# Patient Record
Sex: MALE | Race: OTHER | Employment: FULL TIME | URBAN - METROPOLITAN AREA
[De-identification: names, ages, dates, MRNs, and addresses within clinical notes are randomized per-mention and may not be internally consistent; named-entity substitution may affect disease eponyms.]

---

## 2019-04-10 ENCOUNTER — ORDER TRANSCRIPTION (OUTPATIENT)
Dept: SLEEP CENTER | Age: 49
End: 2019-04-10

## 2019-04-10 DIAGNOSIS — G47.33 OSA (OBSTRUCTIVE SLEEP APNEA): Primary | ICD-10-CM

## 2019-04-11 ENCOUNTER — OFFICE VISIT (OUTPATIENT)
Dept: SLEEP CENTER | Age: 49
End: 2019-04-11
Attending: GENERAL PRACTICE
Payer: COMMERCIAL

## 2019-04-11 DIAGNOSIS — G47.33 OSA (OBSTRUCTIVE SLEEP APNEA): Primary | ICD-10-CM

## 2019-04-11 PROCEDURE — 95806 SLEEP STUDY UNATT&RESP EFFT: CPT

## 2019-04-16 NOTE — PROCEDURES
[address] [facility]  Accredited by the [employer] ([employer])    PATIENT'S NAME: Reed Conception PHYSICIAN:    REFERRING PHYSICIAN:    PATIENT ACCOUNT #: [de-identified] LOCATION: [place] [id number] #: [id number]

## 2020-02-11 ENCOUNTER — HOSPITAL ENCOUNTER (OUTPATIENT)
Dept: GENERAL RADIOLOGY | Age: 50
Discharge: HOME | End: 2020-02-11
Attending: FAMILY MEDICINE

## 2020-02-11 ENCOUNTER — HOSPITAL ENCOUNTER (OUTPATIENT)
Dept: GENERAL RADIOLOGY | Age: 50
End: 2020-02-11
Attending: FAMILY MEDICINE

## 2020-02-11 DIAGNOSIS — M25.551 RIGHT HIP PAIN: ICD-10-CM

## 2020-02-11 PROCEDURE — 73502 X-RAY EXAM HIP UNI 2-3 VIEWS: CPT

## 2021-09-10 ENCOUNTER — OFFICE VISIT (OUTPATIENT)
Dept: OTOLARYNGOLOGY | Facility: CLINIC | Age: 51
End: 2021-09-10
Payer: COMMERCIAL

## 2021-09-10 VITALS — HEIGHT: 69 IN | WEIGHT: 215 LBS | BODY MASS INDEX: 31.84 KG/M2

## 2021-09-10 DIAGNOSIS — G47.33 OBSTRUCTIVE SLEEP APNEA: Primary | ICD-10-CM

## 2021-09-10 DIAGNOSIS — J34.2 DEVIATED NASAL SEPTUM: ICD-10-CM

## 2021-09-10 DIAGNOSIS — H61.23 BILATERAL IMPACTED CERUMEN: ICD-10-CM

## 2021-09-10 PROCEDURE — 3008F BODY MASS INDEX DOCD: CPT | Performed by: OTOLARYNGOLOGY

## 2021-09-10 PROCEDURE — 69210 REMOVE IMPACTED EAR WAX UNI: CPT | Performed by: OTOLARYNGOLOGY

## 2021-09-10 PROCEDURE — 99243 OFF/OP CNSLTJ NEW/EST LOW 30: CPT | Performed by: OTOLARYNGOLOGY

## 2021-09-10 NOTE — PROGRESS NOTES
Sha Valera is a 46year old male. Patient presents with:  Ear Problem: pt presents today both ears swimmers ears. pt c/o both ears with pressure and mild pain. HISTORY OF PRESENT ILLNESS  He presents with a history of obstructive sleep apnea.   Previ bleeding and easy bruising. PHYSICAL EXAM    Ht 5' 9\" (1.753 m)   Wt 215 lb (97.5 kg)   BMI 31.75 kg/m²        Constitutional Normal Overall appearance - Normal.   Psychiatric Normal Orientation - Oriented to time, place, person & situation.  Roni that surgery unlikely to help him here sleep apnea and that he would most benefit from CPAP. Very reluctant to use CPAP and I have recommended that he consider Breathe Right strips to continue using with his taping of his mouth shut.   I did tell him that

## (undated) NOTE — LETTER
No referring provider defined for this encounter. 09/10/21        Patient: Milton Sheldon   YOB: 1970   Date of Visit: 9/10/2021       Dear  Dr. Ivan López MD,      Thank you for referring Milton Sheldon to my practice.   Please find my assess